# Patient Record
Sex: MALE | Race: WHITE
[De-identification: names, ages, dates, MRNs, and addresses within clinical notes are randomized per-mention and may not be internally consistent; named-entity substitution may affect disease eponyms.]

---

## 2020-03-08 ENCOUNTER — HOSPITAL ENCOUNTER (EMERGENCY)
Dept: HOSPITAL 63 - ER | Age: 70
Discharge: HOME | End: 2020-03-08
Payer: MEDICARE

## 2020-03-08 VITALS — DIASTOLIC BLOOD PRESSURE: 86 MMHG | SYSTOLIC BLOOD PRESSURE: 156 MMHG

## 2020-03-08 VITALS — WEIGHT: 240.52 LBS | BODY MASS INDEX: 31.88 KG/M2 | HEIGHT: 73 IN

## 2020-03-08 DIAGNOSIS — R33.9: Primary | ICD-10-CM

## 2020-03-08 LAB
APTT PPP: YELLOW S
BACTERIA #/AREA URNS HPF: 0 /HPF
BILIRUB UR QL STRIP: (no result)
FIBRINOGEN PPP-MCNC: CLEAR MG/DL
GLUCOSE UR STRIP-MCNC: (no result) MG/DL
NITRITE UR QL STRIP: (no result)
RBC #/AREA URNS HPF: (no result) /HPF (ref 0–2)
SP GR UR STRIP: 1.01
SQUAMOUS #/AREA URNS LPF: (no result) /LPF
UROBILINOGEN UR-MCNC: 0.2 MG/DL
WBC #/AREA URNS HPF: (no result) /HPF (ref 0–4)

## 2020-03-08 PROCEDURE — 51701 INSERT BLADDER CATHETER: CPT

## 2020-03-08 PROCEDURE — 99284 EMERGENCY DEPT VISIT MOD MDM: CPT

## 2020-03-08 PROCEDURE — 81001 URINALYSIS AUTO W/SCOPE: CPT

## 2020-03-08 NOTE — PHYS DOC
Past History


Past Medical History:  Other


Additional Past Medical Histor:  bph, tachycardia


Past Surgical History:  Other


Additional Past Surgical Histo:  vein stripping on leg


Alcohol Use:  Occasionally





Adult General


Chief Complaint


Chief Complaint:  URINARY FREQUENCY





HPI


HPI


Patient is a 69-year-old male who presents with urinary frequency and urgency 

for the past 24 hours.  Patient has history of enlarged prostate.  Denies flank 

pain, or abdominal pain, hematuria, dysuria.  No other acute symptoms or 

complaints.  []





Review of Systems


Review of Systems


Review of symptoms as per HPI.  All other review of symptoms are negative


All other systems were reviewed and found to be within normal limits, except as 

documented in this note.





Allergies


Allergies





Allergies








Coded Allergies Type Severity Reaction Last Updated Verified


 


  No Known Drug Allergies    3/8/20 No











Physical Exam


Physical Exam





Constitutional: Well developed, well nourished, no acute distress, non-toxic 

appearance. []


HENT: Normocephalic, atraumatic, bilateral external ears normal, oropharynx 

moist, no oral exudates, nose normal. []


Eyes: PERRLA, EOMI, conjunctiva normal, no discharge. [] [] 


Cardiovascular:Heart rate regular rhythm, no murmur []


Lungs & Thorax:  Bilateral breath sounds clear to auscultation []


Abdomen: Bowel sounds normal, soft, no tenderness. [] 


Back: No tenderness, no CVA tenderness. [] 


Extremities: No tenderness, no cyanosis, no clubbing, ROM intact, no edema. [] 


Neurologic: Alert and oriented X 3, normal motor function, normal sensory 

function, no focal deficits noted. []


Psychologic: Affect normal, judgement normal, mood normal. []





Current Patient Data


Vital Signs





                                   Vital Signs








  Date Time  Temp Pulse Resp B/P (MAP) Pulse Ox O2 Delivery O2 Flow Rate FiO2


 


3/8/20 07:59  98 18 156/86 (109) 96 Room Air  


 


3/8/20 07:07 97.5       








Lab Results





                                Laboratory Tests








Test


 3/8/20


07:11


 


Urine Collection Type Unknown  


 


Urine Color Yellow  


 


Urine Clarity Clear  


 


Urine pH 5.5  


 


Urine Specific Gravity 1.015  


 


Urine Protein


 Neg


(NEG-TRACE)


 


Urine Glucose (UA)


 Neg mg/dL


(NEG)


 


Urine Ketones (Stick)


 Neg mg/dL


(NEG)


 


Urine Blood Neg (NEG)  


 


Urine Nitrite Neg (NEG)  


 


Urine Bilirubin Neg (NEG)  


 


Urine Urobilinogen Dipstick


 0.2 mg/dL (0.2


mg/dL)


 


Urine Leukocyte Esterase Neg (NEG)  


 


Urine RBC


 Occ /HPF (0-2)





 


Urine WBC


 Occ /HPF (0-4)





 


Urine Squamous Epithelial


Cells Occ /LPF  





 


Urine Bacteria


 0 /HPF (0-FEW)





 


Urine Mucus Slight /LPF  











EKG


EKG


[]





Radiology/Procedures


Radiology/Procedures


[]





Course & Med Decision Making


Course & Med Decision Making


Pertinent Labs and Imaging studies reviewed. (See chart for details)





[Greater than 800 mL's of urine present on bladder scan.  Bladder drained with 

straight cath.  Patient declines Boswell catheter.  He is instructed to increase 

prostate medication follow-up with his urologist ASAP.  Return precautions 

reviewed.]





Dragon Disclaimer


Dragon Disclaimer


This electronic medical record was generated, in whole or in part, using a voice

recognition dictation system.





Departure


Departure:


Impression:  


   Primary Impression:  


   Urinary retention


Disposition:  01 HOME/RESIDENCE PRIOR TO ADM


Condition:  STABLE


Patient Instructions:  Urinary Retention, Acute, Male, Easy-to-Read





Additional Instructions:  


Please increase your prostate medication to 1 pills twice daily and follow up 

with local urolgoist or [facility].











EDGAR BEJARANO DO                    Mar 8, 2020 12:32